# Patient Record
Sex: FEMALE | URBAN - METROPOLITAN AREA
[De-identification: names, ages, dates, MRNs, and addresses within clinical notes are randomized per-mention and may not be internally consistent; named-entity substitution may affect disease eponyms.]

---

## 2022-12-27 ENCOUNTER — OFFICE VISIT (OUTPATIENT)
Dept: URBAN - METROPOLITAN AREA SURGERY CENTER 15 | Facility: SURGERY CENTER | Age: 47
End: 2022-12-27

## 2023-01-03 ENCOUNTER — OFFICE VISIT (OUTPATIENT)
Dept: URBAN - METROPOLITAN AREA SURGERY CENTER 15 | Facility: SURGERY CENTER | Age: 48
End: 2023-01-03

## 2024-03-19 ENCOUNTER — OV NP (OUTPATIENT)
Dept: URBAN - METROPOLITAN AREA CLINIC 23 | Facility: CLINIC | Age: 49
End: 2024-03-19
Payer: COMMERCIAL

## 2024-03-19 ENCOUNTER — LAB (OUTPATIENT)
Dept: URBAN - METROPOLITAN AREA CLINIC 23 | Facility: CLINIC | Age: 49
End: 2024-03-19

## 2024-03-19 VITALS
TEMPERATURE: 98.1 F | HEART RATE: 98 BPM | WEIGHT: 192 LBS | SYSTOLIC BLOOD PRESSURE: 169 MMHG | HEIGHT: 66 IN | DIASTOLIC BLOOD PRESSURE: 85 MMHG | BODY MASS INDEX: 30.86 KG/M2

## 2024-03-19 DIAGNOSIS — Z85.3 HISTORY OF BREAST CANCER: ICD-10-CM

## 2024-03-19 DIAGNOSIS — Z90.13 HISTORY OF BILATERAL MASTECTOMY: ICD-10-CM

## 2024-03-19 DIAGNOSIS — K62.5 RECTAL BLEEDING: ICD-10-CM

## 2024-03-19 DIAGNOSIS — Z86.69 HISTORY OF UVEITIS: ICD-10-CM

## 2024-03-19 DIAGNOSIS — Z83.79 FAMILY HISTORY OF CROHN'S DISEASE: ICD-10-CM

## 2024-03-19 PROCEDURE — 99204 OFFICE O/P NEW MOD 45 MIN: CPT | Performed by: NURSE PRACTITIONER

## 2024-03-19 NOTE — HPI-TODAY'S VISIT:
48 year old with months of bright red blood per rectum described as blood streaked on formed stool.  Reports increased bowel movements with thin caliber stools. No stool urgency or incontinence Associated rectal discomfort with passage of stool but no pruritus. No abdominal pain No nausea or changes in appetite. Reports weight gain with tamoxifen No abdominal surgeries. Was diagnosed with uveitis 1 year ago. Mother and son with Crohn's disease.  No family history of GI malignancy. No person history of screening colonoscopy.  No reports of anemia.  No cardiac or respiratory problems. Not on anticoagulation or GLP-1.   History of breast cancer s/p double masectomy in 2022 and reconstruction 2023 Tamfoxin with vaginal bleeding and dryness

## 2024-03-19 NOTE — EXAM-PHYSICAL EXAM
General--no acute distress Eyes--anicteric, clear conjunctiva HENT--normocephalic, atraumatic head, oropharynx clear Neck--trachea midline Chest--clear to auscultation anteriorly, equal chest rise and fall Heart--regular rate and rhythm Abdomen--soft, non tender, non distended, bowel sounds present Musculoskeletal--normal gait and station Skin--no rashes Neurologic--Alert and oriented x 3 Psychiatric--stable mood, appropriate affect

## 2024-03-20 PROBLEM — 428529004: Status: ACTIVE | Noted: 2024-03-20

## 2024-03-20 PROBLEM — 429087003: Status: ACTIVE | Noted: 2024-03-20

## 2024-04-02 ENCOUNTER — LAB (OUTPATIENT)
Dept: URBAN - METROPOLITAN AREA CLINIC 12 | Facility: CLINIC | Age: 49
End: 2024-04-02

## 2024-04-02 ENCOUNTER — COLON (OUTPATIENT)
Dept: URBAN - METROPOLITAN AREA SURGERY CENTER 15 | Facility: SURGERY CENTER | Age: 49
End: 2024-04-02
Payer: COMMERCIAL

## 2024-04-02 DIAGNOSIS — K92.1 ACUTE MELENA: ICD-10-CM

## 2024-04-02 DIAGNOSIS — R19.4 ALTERATION IN BOWEL ELIMINATION: ICD-10-CM

## 2024-04-02 DIAGNOSIS — K52.89 OTHER AND UNSPECIFIED NONINFECTIOUS GASTROENTERITIS AND COLITIS: ICD-10-CM

## 2024-04-02 PROBLEM — 16098861000119104: Status: ACTIVE | Noted: 2024-04-02

## 2024-04-02 PROCEDURE — 45380 COLONOSCOPY AND BIOPSY: CPT | Performed by: INTERNAL MEDICINE

## 2024-04-17 ENCOUNTER — OV EP (OUTPATIENT)
Dept: URBAN - METROPOLITAN AREA CLINIC 12 | Facility: CLINIC | Age: 49
End: 2024-04-17
Payer: COMMERCIAL

## 2024-04-17 VITALS
SYSTOLIC BLOOD PRESSURE: 165 MMHG | BODY MASS INDEX: 30.53 KG/M2 | DIASTOLIC BLOOD PRESSURE: 91 MMHG | WEIGHT: 190 LBS | HEIGHT: 66 IN | TEMPERATURE: 98.1 F | HEART RATE: 102 BPM

## 2024-04-17 DIAGNOSIS — Z86.69 HISTORY OF UVEITIS: ICD-10-CM

## 2024-04-17 DIAGNOSIS — Z85.3 HISTORY OF BREAST CANCER: ICD-10-CM

## 2024-04-17 DIAGNOSIS — K52.9 ILEITIS: ICD-10-CM

## 2024-04-17 DIAGNOSIS — A02.9 SALMONELLA: ICD-10-CM

## 2024-04-17 DIAGNOSIS — Z83.79 FAMILY HISTORY OF CROHN'S DISEASE: ICD-10-CM

## 2024-04-17 DIAGNOSIS — K62.5 RECTAL BLEEDING: ICD-10-CM

## 2024-04-17 DIAGNOSIS — Z90.13 HISTORY OF BILATERAL MASTECTOMY: ICD-10-CM

## 2024-04-17 PROCEDURE — 99215 OFFICE O/P EST HI 40 MIN: CPT | Performed by: INTERNAL MEDICINE

## 2024-04-17 RX ORDER — BUDESONIDE 3 MG/1
3 CAPSULES CAPSULE ORAL ONCE A DAY
Qty: 90 CAPSULE | Refills: 1 | OUTPATIENT

## 2024-04-17 NOTE — HPI-TODAY'S VISIT:
Rheum -uveitis- Dr. Santiago [facility] Oncology -hx breast ca- Dr. Amalia Calixto  -The patient reports ongoing issues with stool consistency, stating that they have not had a solid stool yet. They completed a three-day course of antibiotics, and while the bleeding has improved, it is still present and appears dark or maroon in color. The patient describes the stool as never being formed and often thin. They experience cramping after eating and have significant stomach discomfort.  They took anti-inflammatories for hip pain in the past but not consistently leading up to the procedure.   The patient has not started the prescribed steroid due to uncertainty about the pathology results.  The patient's appetite has been slightly reduced, but they have not experienced any weight loss. They report no nausea or other symptoms, aside from cramping.

## 2024-04-17 NOTE — PREVIOUS WORKUP REVIEWED EXTERNAL MEDICAL RECORD
.ENDOSCOPIESColonoscopy April 2, 2024-performed by me-diffuse inflammation with congestion/deep ulcerations/friability extending throughout the final 10 cm of the terminal ileum graded as i4. -Biopsies show acute inflammatory cells with villous epithelium.  No granuloma seen.  Consistent with bacterial infection, virus ischemic enteritis NSAID use and idiopathic IBD- scattered ulcerations and erythema in the ascending colon and cecum__> Biopsies show patchy focal active colitis with acute inflammatory cells in the surface epithelium with cryptitis and mild architectural disarray with crypt dropout but otherwise no definitive features of chronic colitis.Shallow ulcerations and erythema in the rectumLABSIMAGES

## 2024-04-19 PROBLEM — 160386006: Status: ACTIVE | Noted: 2024-04-19

## 2024-04-19 PROBLEM — 12063002: Status: ACTIVE | Noted: 2024-04-19

## 2024-05-01 ENCOUNTER — DASHBOARD ENCOUNTERS (OUTPATIENT)
Age: 49
End: 2024-05-01

## 2024-05-01 ENCOUNTER — OFFICE VISIT (OUTPATIENT)
Dept: URBAN - METROPOLITAN AREA TELEHEALTH 2 | Facility: TELEHEALTH | Age: 49
End: 2024-05-01
Payer: COMMERCIAL

## 2024-05-01 DIAGNOSIS — Z86.69 HISTORY OF UVEITIS: ICD-10-CM

## 2024-05-01 DIAGNOSIS — K52.9 ILEITIS: ICD-10-CM

## 2024-05-01 DIAGNOSIS — K62.5 RECTAL BLEEDING: ICD-10-CM

## 2024-05-01 DIAGNOSIS — Z83.79 FAMILY HISTORY OF CROHN'S DISEASE: ICD-10-CM

## 2024-05-01 PROCEDURE — 99215 OFFICE O/P EST HI 40 MIN: CPT | Performed by: INTERNAL MEDICINE

## 2024-05-01 RX ORDER — PANTOPRAZOLE SODIUM 40 MG/1
1 TABLET TABLET, DELAYED RELEASE ORAL ONCE A DAY
Qty: 30 TABLET | Refills: 11 | OUTPATIENT
Start: 2024-05-01

## 2024-05-01 RX ORDER — BUDESONIDE 3 MG/1
3 CAPSULES CAPSULE ORAL ONCE A DAY
Qty: 90 CAPSULE | Refills: 1 | Status: ACTIVE | COMMUNITY

## 2024-05-10 ENCOUNTER — LAB OUTSIDE AN ENCOUNTER (OUTPATIENT)
Dept: URBAN - METROPOLITAN AREA CLINIC 23 | Facility: CLINIC | Age: 49
End: 2024-05-10

## 2024-09-03 ENCOUNTER — LAB OUTSIDE AN ENCOUNTER (OUTPATIENT)
Dept: URBAN - METROPOLITAN AREA TELEHEALTH 2 | Facility: TELEHEALTH | Age: 49
End: 2024-09-03

## 2024-09-03 ENCOUNTER — TELEPHONE ENCOUNTER (OUTPATIENT)
Dept: URBAN - METROPOLITAN AREA CLINIC 96 | Facility: CLINIC | Age: 49
End: 2024-09-03

## 2024-09-03 ENCOUNTER — OFFICE VISIT (OUTPATIENT)
Dept: URBAN - METROPOLITAN AREA TELEHEALTH 2 | Facility: TELEHEALTH | Age: 49
End: 2024-09-03
Payer: COMMERCIAL

## 2024-09-03 DIAGNOSIS — Z86.69 HISTORY OF UVEITIS: ICD-10-CM

## 2024-09-03 DIAGNOSIS — K62.5 RECTAL BLEEDING: ICD-10-CM

## 2024-09-03 DIAGNOSIS — K52.89 (LYMPHOCYTIC) MICROSCOPIC COLITIS: ICD-10-CM

## 2024-09-03 DIAGNOSIS — Z83.79 FAMILY HISTORY OF CROHN'S DISEASE: ICD-10-CM

## 2024-09-03 PROCEDURE — 99214 OFFICE O/P EST MOD 30 MIN: CPT | Performed by: INTERNAL MEDICINE

## 2024-09-03 RX ORDER — PANTOPRAZOLE SODIUM 40 MG/1
1 TABLET TABLET, DELAYED RELEASE ORAL ONCE A DAY
Qty: 30 TABLET | Refills: 11 | Status: ACTIVE | COMMUNITY
Start: 2024-05-01

## 2024-09-03 RX ORDER — PANTOPRAZOLE SODIUM 40 MG/1
1 TABLET TABLET, DELAYED RELEASE ORAL ONCE A DAY
Qty: 30 TABLET | Refills: 11 | OUTPATIENT

## 2024-09-03 RX ORDER — BUDESONIDE 3 MG/1
3 CAPSULES CAPSULE ORAL ONCE A DAY
Qty: 90 CAPSULE | Refills: 1 | Status: ACTIVE | COMMUNITY

## 2024-09-03 NOTE — HPI-TODAY'S VISIT:
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 . Pt is referred by Dr. Haji. . Pt was dxd with possible IBD.  Sxs started in 3/2024.  Has h/o uveitis. . Previously on 5/1/2024, pt reports that her abd pain has improved.  She has up to 7-10 B per day, blood sometimes, with streaks.  No constipation.  Now solid.  Has gained some weight.  Started on 7 day steroid taper.  Has hip pain, often. . Today on 9/3/2024, pt reports that she has periods of time where she is normalized and then has diarrhea up to 6 BM per day.  Uveitis has been flaring over again and cannot get off of the eye drops. . 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 4/2024: - Severe inflammation was found in the ileum secondary to ileitis. Biopsied.- Inflammation was found. This was graded as Rutgeerts Score i4 (diffuse inflammation with large deeplesions and/or narrowing). Appearance is concerning for crohns disease- Scattered mild inflammation was found in the ascending colon and in the cecum secondary to colitis.Biopsied.- Localized mild inflammation was found in the rectum secondary to colitis. Biopsied.- Biopsies were taken with a cold forceps for histology from the left colon. . A. Terminal ileum, biopsy:Active ileitis with ulceration. See microscopic description.B. Ascending colon, biopsy:Focal active colitis with ulceration and mild architectural disarray. See microscopic description.C. Left colon, biopsy:Colonic mucosa with no significant histopathology.No histologic evidence of active, chronic or microscopic colitis.D. Rectum, colon biopsy:Focal/patchy active colitis with mild architectural disarray. See microscopic description. . A. The ileal biopsy reveals acute inflammatory cells within villous epithelium. No granulomata are seen. Changes such asthese may be seen with ischemic enteritis, viruses, bacterial infections, following NSAID use, and idiopathic inflammatorybowel disease. Clinical, endoscopic and pathologic correlation is required.B, D. Microscopic review of the ascending colonic and rectal biopsies reveals the presence of patchy/focal active colitis.Acute inflammatory cells infiltrate the surface epithelium and are present within the lamina propria. There is cryptitis. Thelamina propria is expanded by a mixed chronic inflammatory infiltrate. There is some mild architectural disarray with cryptdrop out in areas of heavy inflammation but no other definitive features of chronic colitis such as crypt branching orfibrosis. No histologic evidence of granulomata or dysplasia. The differential diagnosis includes: acute self-limiting colitis,infectious colitis, or possibly early inflammatory bowel disease. Clinical, endoscopic and pathologic correlation isrequired.Microscopic examination supports the above diagnosis. All tissue controls show appropriate reactivity.

## 2024-11-08 ENCOUNTER — CLAIMS CREATED FROM THE CLAIM WINDOW (OUTPATIENT)
Dept: URBAN - METROPOLITAN AREA SURGERY CENTER 18 | Facility: SURGERY CENTER | Age: 49
End: 2024-11-08
Payer: COMMERCIAL

## 2024-11-08 ENCOUNTER — CLAIMS CREATED FROM THE CLAIM WINDOW (OUTPATIENT)
Dept: URBAN - METROPOLITAN AREA CLINIC 4 | Facility: CLINIC | Age: 49
End: 2024-11-08
Payer: COMMERCIAL

## 2024-11-08 DIAGNOSIS — K52.3 COLITIS, INDETERMINATE: ICD-10-CM

## 2024-11-08 DIAGNOSIS — D50.9 IRON DEFICIENCY ANEMIA, UNSPECIFIED IRON DEFICIENCY ANEMIA TYPE: ICD-10-CM

## 2024-11-08 DIAGNOSIS — K92.1 HEMATOCHEZIA: ICD-10-CM

## 2024-11-08 DIAGNOSIS — K50.00 CROHN'S DISEASE OF SMALL INTESTINE WITHOUT COMPLICATIONS: ICD-10-CM

## 2024-11-08 DIAGNOSIS — K63.3 COLON ULCER: ICD-10-CM

## 2024-11-08 PROCEDURE — 88341 IMHCHEM/IMCYTCHM EA ADD ANTB: CPT | Performed by: PATHOLOGY

## 2024-11-08 PROCEDURE — 00811 ANES LWR INTST NDSC NOS: CPT | Performed by: NURSE ANESTHETIST, CERTIFIED REGISTERED

## 2024-11-08 PROCEDURE — 45380 COLONOSCOPY AND BIOPSY: CPT | Performed by: INTERNAL MEDICINE

## 2024-11-08 PROCEDURE — 88342 IMHCHEM/IMCYTCHM 1ST ANTB: CPT | Performed by: PATHOLOGY

## 2024-11-08 PROCEDURE — 88305 TISSUE EXAM BY PATHOLOGIST: CPT | Performed by: PATHOLOGY

## 2024-11-08 RX ORDER — PANTOPRAZOLE SODIUM 40 MG/1
1 TABLET TABLET, DELAYED RELEASE ORAL ONCE A DAY
Qty: 30 TABLET | Refills: 11 | Status: ACTIVE | COMMUNITY

## 2024-11-08 RX ORDER — BUDESONIDE 3 MG/1
3 CAPSULES CAPSULE ORAL ONCE A DAY
Qty: 90 CAPSULE | Refills: 1 | Status: ACTIVE | COMMUNITY

## 2024-11-08 RX ORDER — PANTOPRAZOLE SODIUM 40 MG/1
1 TABLET TABLET, DELAYED RELEASE ORAL ONCE A DAY
Qty: 30 TABLET | Refills: 11 | OUTPATIENT

## 2024-11-08 NOTE — HPI-TODAY'S VISIT:
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 . Pt is referred by Dr. Haji. . Pt was dxd with possible IBD.  Sxs started in 3/2024.  Has h/o uveitis. . Previously on 5/1/2024, pt reports that her abd pain has improved.  She has up to 7-10 B per day, blood sometimes, with streaks.  No constipation.  Now solid.  Has gained some weight.  Started on 7 day steroid taper.  Has hip pain, often. . Today on 9/3/2024, pt reports that she has periods of time where she is normalized and then has diarrhea up to 6 BM per day.  Uveitis has been flaring over again and cannot get off of the eye drops. . 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 4/2024: - Severe inflammation was found in the ileum secondary to ileitis. Biopsied.- Inflammation was found. This was graded as Rutgeerts Score i4 (diffuse inflammation with large deeplesions and/or narrowing). Appearance is concerning for crohns disease- Scattered mild inflammation was found in the ascending colon and in the cecum secondary to colitis.Biopsied.- Localized mild inflammation was found in the rectum secondary to colitis. Biopsied.- Biopsies were taken with a cold forceps for histology from the left colon. . A. Terminal ileum, biopsy:Active ileitis with ulceration. See microscopic description.B. Ascending colon, biopsy:Focal active colitis with ulceration and mild architectural disarray. See microscopic description.C. Left colon, biopsy:Colonic mucosa with no significant histopathology.No histologic evidence of active, chronic or microscopic colitis.D. Rectum, colon biopsy:Focal/patchy active colitis with mild architectural disarray. See microscopic description. . A. The ileal biopsy reveals acute inflammatory cells within villous epithelium. No granulomata are seen. Changes such asthese may be seen with ischemic enteritis, viruses, bacterial infections, following NSAID use, and idiopathic inflammatorybowel disease. Clinical, endoscopic and pathologic correlation is required.B, D. Microscopic review of the ascending colonic and rectal biopsies reveals the presence of patchy/focal active colitis.Acute inflammatory cells infiltrate the surface epithelium and are present within the lamina propria. There is cryptitis. Thelamina propria is expanded by a mixed chronic inflammatory infiltrate. There is some mild architectural disarray with cryptdrop out in areas of heavy inflammation but no other definitive features of chronic colitis such as crypt branching orfibrosis. No histologic evidence of granulomata or dysplasia. The differential diagnosis includes: acute self-limiting colitis,infectious colitis, or possibly early inflammatory bowel disease. Clinical, endoscopic and pathologic correlation isrequired.Microscopic examination supports the above diagnosis. All tissue controls show appropriate reactivity.

## 2024-12-03 ENCOUNTER — OFFICE VISIT (OUTPATIENT)
Dept: URBAN - METROPOLITAN AREA CLINIC 96 | Facility: CLINIC | Age: 49
End: 2024-12-03
Payer: COMMERCIAL

## 2024-12-03 VITALS
RESPIRATION RATE: 18 BRPM | BODY MASS INDEX: 27.97 KG/M2 | HEART RATE: 94 BPM | SYSTOLIC BLOOD PRESSURE: 164 MMHG | TEMPERATURE: 98.1 F | HEIGHT: 66 IN | WEIGHT: 174 LBS | DIASTOLIC BLOOD PRESSURE: 86 MMHG

## 2024-12-03 DIAGNOSIS — Z83.79 FAMILY HISTORY OF CROHN'S DISEASE: ICD-10-CM

## 2024-12-03 DIAGNOSIS — H20.9 UVEITIS: ICD-10-CM

## 2024-12-03 DIAGNOSIS — M25.50 MULTIPLE JOINT PAIN: ICD-10-CM

## 2024-12-03 DIAGNOSIS — K50.00 CROHN'S DISEASE OF SMALL INTESTINE WITHOUT COMPLICATIONS: ICD-10-CM

## 2024-12-03 DIAGNOSIS — K62.5 RECTAL BLEEDING: ICD-10-CM

## 2024-12-03 PROCEDURE — 99214 OFFICE O/P EST MOD 30 MIN: CPT

## 2024-12-03 RX ORDER — BUDESONIDE 3 MG/1
3 CAPSULES CAPSULE ORAL ONCE A DAY
Qty: 90 CAPSULE | Refills: 1 | Status: ACTIVE | COMMUNITY

## 2024-12-03 RX ORDER — PANTOPRAZOLE SODIUM 40 MG/1
1 TABLET TABLET, DELAYED RELEASE ORAL ONCE A DAY
Qty: 30 TABLET | Refills: 11 | Status: ACTIVE | COMMUNITY

## 2024-12-03 RX ORDER — RISANKIZUMAB-RZAA 360 MG/2.4
AT WEEK 12 WEARABLE INJECTOR SUBCUTANEOUS
Qty: 1 | Refills: 11 | OUTPATIENT
Start: 2024-12-04 | End: 2026-10-07

## 2024-12-03 RX ORDER — RISANKIZUMAB-RZAA 60 MG/ML
AS DIRECTED INJECTION INTRAVENOUS
Qty: 600 | Refills: 0 | OUTPATIENT
Start: 2024-12-04

## 2024-12-03 NOTE — HPI-TODAY'S VISIT:
Pt Rose Marie is a 48 y/o indiv with  ileal CD, currently on no meds, here with her  for follow-up after colonoscopy. . Pt is referred by Dr. Haji. . Pt was dxd with possible IBD.  Sxs started in 3/2024.  Has h/o uveitis. . Previously on 5/1/2024, pt reports that her abd pain has improved.  She has up to 7-10 B per day, blood sometimes, with streaks.  No constipation.  Now solid.  Has gained some weight.  Started on 7 day steroid taper.  Has hip pain, often. . Previously on 9/3/2024, pt reports that she has periods of time where she is normalized and then has diarrhea up to 6 BM per day.  Uveitis has been flaring over again and cannot get off of the eye drops. . Today on 12/2/2024:  On eye drops for uveitis.  Can't stop without symptoms immediately return.  Joint pain.   . 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 11/8/2024: Diffuse moderate inflammation characterized by deep ulcerations, granularity and friability found in terminal ileum.  Remainder of examined colon unremarkable.  Repeat 1 year . Terminal ileum biopsy: Chronic active ileitis with aphthous ulcer and pyloric gland metaplasia, consistent with Crohn's disease. . 4/2024: - Severe inflammation was found in the ileum secondary to ileitis. Biopsied.- Inflammation was found. This was graded as Rutgeerts Score i4 (diffuse inflammation with large deeplesions and/or narrowing). Appearance is concerning for crohns disease- Scattered mild inflammation was found in the ascending colon and in the cecum secondary to colitis.Biopsied.- Localized mild inflammation was found in the rectum secondary to colitis. Biopsied.- Biopsies were taken with a cold forceps for histology from the left colon. . A. Terminal ileum, biopsy:Active ileitis with ulceration. See microscopic description.B. Ascending colon, biopsy:Focal active colitis with ulceration and mild architectural disarray. See microscopic description.C. Left colon, biopsy:Colonic mucosa with no significant histopathology.No histologic evidence of active, chronic or microscopic colitis.D. Rectum, colon biopsy:Focal/patchy active colitis with mild architectural disarray. See microscopic description. . A. The ileal biopsy reveals acute inflammatory cells within villous epithelium. No granulomata are seen. Changes such asthese may be seen with ischemic enteritis, viruses, bacterial infections, following NSAID use, and idiopathic inflammatorybowel disease. Clinical, endoscopic and pathologic correlation is required.B, D. Microscopic review of the ascending colonic and rectal biopsies reveals the presence of patchy/focal active colitis.Acute inflammatory cells infiltrate the surface epithelium and are present within the lamina propria. There is cryptitis. Thelamina propria is expanded by a mixed chronic inflammatory infiltrate. There is some mild architectural disarray with cryptdrop out in areas of heavy inflammation but no other definitive features of chronic colitis such as crypt branching orfibrosis. No histologic evidence of granulomata or dysplasia. The differential diagnosis includes: acute self-limiting colitis,infectious colitis, or possibly early inflammatory bowel disease. Clinical, endoscopic and pathologic correlation isrequired.Microscopic examination supports the above diagnosis. All tissue controls show appropriate reactivity.

## 2024-12-04 PROBLEM — 35678005: Status: ACTIVE | Noted: 2024-12-04

## 2024-12-04 PROBLEM — 56689002: Status: ACTIVE | Noted: 2024-12-04

## 2024-12-04 PROBLEM — 128473001: Status: ACTIVE | Noted: 2024-12-04

## 2024-12-18 ENCOUNTER — TELEPHONE ENCOUNTER (OUTPATIENT)
Dept: URBAN - METROPOLITAN AREA CLINIC 96 | Facility: CLINIC | Age: 49
End: 2024-12-18

## 2024-12-20 ENCOUNTER — TELEPHONE ENCOUNTER (OUTPATIENT)
Dept: URBAN - METROPOLITAN AREA CLINIC 96 | Facility: CLINIC | Age: 49
End: 2024-12-20

## 2024-12-23 ENCOUNTER — TELEPHONE ENCOUNTER (OUTPATIENT)
Dept: URBAN - METROPOLITAN AREA CLINIC 96 | Facility: CLINIC | Age: 49
End: 2024-12-23

## 2024-12-24 ENCOUNTER — TELEPHONE ENCOUNTER (OUTPATIENT)
Dept: URBAN - METROPOLITAN AREA CLINIC 50 | Facility: CLINIC | Age: 49
End: 2024-12-24

## 2025-01-08 ENCOUNTER — TELEPHONE ENCOUNTER (OUTPATIENT)
Dept: URBAN - METROPOLITAN AREA CLINIC 96 | Facility: CLINIC | Age: 50
End: 2025-01-08

## 2025-01-08 RX ORDER — USTEKINUMAB 130 MG/26ML
AS DIRECTED SOLUTION INTRAVENOUS ONCE
OUTPATIENT
Start: 2025-01-15 | End: 2025-01-16

## 2025-01-08 RX ORDER — USTEKINUMAB 90 MG/ML
PRE-FILLED SYRINGE INJECTION, SOLUTION SUBCUTANEOUS
Qty: 1 | Refills: 11 | OUTPATIENT
Start: 2025-01-15 | End: 2026-11-18

## 2025-02-28 ENCOUNTER — OFFICE VISIT (OUTPATIENT)
Dept: URBAN - METROPOLITAN AREA CLINIC 77 | Facility: CLINIC | Age: 50
End: 2025-02-28

## 2025-02-28 VITALS
HEIGHT: 66 IN | RESPIRATION RATE: 20 BRPM | TEMPERATURE: 98.5 F | BODY MASS INDEX: 29.32 KG/M2 | DIASTOLIC BLOOD PRESSURE: 91 MMHG | SYSTOLIC BLOOD PRESSURE: 140 MMHG | WEIGHT: 182.4 LBS

## 2025-02-28 RX ORDER — RISANKIZUMAB-RZAA 360 MG/2.4
AT WEEK 12 WEARABLE INJECTOR SUBCUTANEOUS
Qty: 1 | Refills: 11 | Status: ACTIVE | COMMUNITY
Start: 2024-12-04 | End: 2026-10-07

## 2025-02-28 RX ORDER — USTEKINUMAB 90 MG/ML
PRE-FILLED SYRINGE INJECTION, SOLUTION SUBCUTANEOUS
Qty: 1 | Refills: 11 | Status: ACTIVE | COMMUNITY
Start: 2025-01-15 | End: 2026-11-18

## 2025-02-28 RX ORDER — BUDESONIDE 3 MG/1
3 CAPSULES CAPSULE ORAL ONCE A DAY
Qty: 90 CAPSULE | Refills: 1 | Status: ACTIVE | COMMUNITY

## 2025-02-28 RX ORDER — RISANKIZUMAB-RZAA 60 MG/ML
AS DIRECTED INJECTION INTRAVENOUS
Qty: 600 | Refills: 0 | Status: ACTIVE | COMMUNITY
Start: 2024-12-04

## 2025-02-28 RX ORDER — PANTOPRAZOLE SODIUM 40 MG/1
1 TABLET TABLET, DELAYED RELEASE ORAL ONCE A DAY
Qty: 30 TABLET | Refills: 11 | Status: ACTIVE | COMMUNITY

## 2025-03-12 ENCOUNTER — OFFICE VISIT (OUTPATIENT)
Dept: URBAN - METROPOLITAN AREA TELEHEALTH 2 | Facility: TELEHEALTH | Age: 50
End: 2025-03-12
Payer: COMMERCIAL

## 2025-03-12 VITALS — WEIGHT: 175 LBS | BODY MASS INDEX: 28.12 KG/M2 | HEIGHT: 66 IN

## 2025-03-12 DIAGNOSIS — K50.00 CROHN'S DISEASE OF SMALL INTESTINE WITHOUT COMPLICATIONS: ICD-10-CM

## 2025-03-12 DIAGNOSIS — H20.9 UVEITIS: ICD-10-CM

## 2025-03-12 DIAGNOSIS — R19.7 DIARRHEA: ICD-10-CM

## 2025-03-12 DIAGNOSIS — Z83.79 FAMILY HISTORY OF CROHN'S DISEASE: ICD-10-CM

## 2025-03-12 DIAGNOSIS — M25.50 MULTIPLE JOINT PAIN: ICD-10-CM

## 2025-03-12 PROCEDURE — 99214 OFFICE O/P EST MOD 30 MIN: CPT | Performed by: INTERNAL MEDICINE

## 2025-03-12 RX ORDER — RISANKIZUMAB-RZAA 360 MG/2.4
AT WEEK 12 WEARABLE INJECTOR SUBCUTANEOUS
Qty: 1 | Refills: 11 | Status: DISCONTINUED | COMMUNITY
Start: 2024-12-04 | End: 2026-10-07

## 2025-03-12 RX ORDER — USTEKINUMAB 90 MG/ML
1 INJECTION INJECTION, SOLUTION SUBCUTANEOUS ONCE
Qty: 1 | Refills: 11 | OUTPATIENT
Start: 2025-03-12 | End: 2027-01-12

## 2025-03-12 RX ORDER — BUDESONIDE 3 MG/1
3 CAPSULES CAPSULE ORAL ONCE A DAY
Qty: 90 CAPSULE | Refills: 1 | Status: ON HOLD | COMMUNITY

## 2025-03-12 RX ORDER — RISANKIZUMAB-RZAA 60 MG/ML
AS DIRECTED INJECTION INTRAVENOUS
Qty: 600 | Refills: 0 | Status: DISCONTINUED | COMMUNITY
Start: 2024-12-04

## 2025-03-12 RX ORDER — PANTOPRAZOLE SODIUM 40 MG/1
1 TABLET TABLET, DELAYED RELEASE ORAL ONCE A DAY
Qty: 30 TABLET | Refills: 11 | Status: ON HOLD | COMMUNITY

## 2025-03-12 RX ORDER — USTEKINUMAB 90 MG/ML
PRE-FILLED SYRINGE INJECTION, SOLUTION SUBCUTANEOUS
Qty: 1 | Refills: 11 | Status: ACTIVE | COMMUNITY
Start: 2025-01-15 | End: 2026-11-18

## 2025-03-12 NOTE — HPI-TODAY'S VISIT:
Pt Rose Marie is a 48 y/o indiv with  ileal CD, currently on Stelara (started 2/2025), here for followup. . Pt is referred by Dr. Haji. . Pt was dxd with possible IBD.  Sxs started in 3/2024.  Has h/o uveitis. . Previously on 5/1/2024, pt reports that her abd pain has improved.  She has up to 7-10 B per day, blood sometimes, with streaks.  No constipation.  Now solid.  Has gained some weight.  Started on 7 day steroid taper.  Has hip pain, often. . Previously on 9/3/2024, pt reports that she has periods of time where she is normalized and then has diarrhea up to 6 BM per day.  Uveitis has been flaring over again and cannot get off of the eye drops. . Prev on 12/2/2024:  On eye drops for uveitis.  Can't stop without symptoms immediately return. Joint pain.   . Today on 3/12/2025, some improvement in diarrhea from 4 daily. . SH: [facility] [employer] ([employer] school) FH: Son with CD, on Yusimry weekly . 11/8/2024: Diffuse moderate inflammation characterized by deep ulcerations, granularity and friability found in terminal ileum.  Remainder of examined colon unremarkable.  Repeat 1 year . Terminal ileum biopsy: Chronic active ileitis with aphthous ulcer and pyloric gland metaplasia, consistent with Crohn's disease. . 4/2024: - Severe inflammation was found in the ileum secondary to ileitis. Biopsied.- Inflammation was found. This was graded as Rutgeerts Score i4 (diffuse inflammation with large deeplesions and/or narrowing). Appearance is concerning for crohns disease- Scattered mild inflammation was found in the ascending colon and in the cecum secondary to colitis.Biopsied.- Localized mild inflammation was found in the rectum secondary to colitis. Biopsied.- Biopsies were taken with a cold forceps for histology from the left colon. . A. Terminal ileum, biopsy:Active ileitis with ulceration. See microscopic description.B. Ascending colon, biopsy:Focal active colitis with ulceration and mild architectural disarray. See microscopic description.C. Left colon, biopsy:Colonic mucosa with no significant histopathology.No histologic evidence of active, chronic or microscopic colitis.D. Rectum, colon biopsy:Focal/patchy active colitis with mild architectural disarray. See microscopic description. . A. The ileal biopsy reveals acute inflammatory cells within villous epithelium. No granulomata are seen. Changes such asthese may be seen with ischemic enteritis, viruses, bacterial infections, following NSAID use, and idiopathic inflammatorybowel disease. Clinical, endoscopic and pathologic correlation is required.B, D. Microscopic review of the ascending colonic and rectal biopsies reveals the presence of patchy/focal active colitis.Acute inflammatory cells infiltrate the surface epithelium and are present within the lamina propria. There is cryptitis. Thelamina propria is expanded by a mixed chronic inflammatory infiltrate. There is some mild architectural disarray with cryptdrop out in areas of heavy inflammation but no other definitive features of chronic colitis such as crypt branching orfibrosis. No histologic evidence of granulomata or dysplasia. The differential diagnosis includes: acute self-limiting colitis,infectious colitis, or possibly early inflammatory bowel disease. Clinical, endoscopic and pathologic correlation isrequired.Microscopic examination supports the above diagnosis. All tissue controls show appropriate reactivity.

## 2025-06-27 ENCOUNTER — TELEPHONE ENCOUNTER (OUTPATIENT)
Dept: URBAN - METROPOLITAN AREA CLINIC 96 | Facility: CLINIC | Age: 50
End: 2025-06-27

## 2025-07-09 ENCOUNTER — TELEPHONE ENCOUNTER (OUTPATIENT)
Dept: URBAN - METROPOLITAN AREA CLINIC 96 | Facility: CLINIC | Age: 50
End: 2025-07-09